# Patient Record
Sex: FEMALE | Race: WHITE | HISPANIC OR LATINO | URBAN - METROPOLITAN AREA
[De-identification: names, ages, dates, MRNs, and addresses within clinical notes are randomized per-mention and may not be internally consistent; named-entity substitution may affect disease eponyms.]

---

## 2018-06-18 ENCOUNTER — HISTORICAL (OUTPATIENT)
Dept: ADMINISTRATIVE | Facility: HOSPITAL | Age: 48
End: 2018-06-18

## 2018-06-25 ENCOUNTER — HISTORICAL (OUTPATIENT)
Dept: ADMINISTRATIVE | Facility: HOSPITAL | Age: 48
End: 2018-06-25

## 2018-07-02 ENCOUNTER — HISTORICAL (OUTPATIENT)
Dept: ADMINISTRATIVE | Facility: HOSPITAL | Age: 48
End: 2018-07-02

## 2018-07-23 ENCOUNTER — HISTORICAL (OUTPATIENT)
Dept: ADMINISTRATIVE | Facility: HOSPITAL | Age: 48
End: 2018-07-23

## 2020-02-27 DIAGNOSIS — R56.9 SEIZURES: Primary | ICD-10-CM

## 2020-03-04 ENCOUNTER — TELEPHONE (OUTPATIENT)
Dept: NEUROLOGY | Facility: CLINIC | Age: 50
End: 2020-03-04

## 2020-03-05 ENCOUNTER — TELEPHONE (OUTPATIENT)
Dept: NEUROLOGY | Facility: CLINIC | Age: 50
End: 2020-03-05

## 2020-03-05 NOTE — TELEPHONE ENCOUNTER
Patient returned my call to schedule emu. She stated that she is not interested in having the emu done. I informed  office.

## 2022-04-09 ENCOUNTER — HISTORICAL (OUTPATIENT)
Dept: ADMINISTRATIVE | Facility: HOSPITAL | Age: 52
End: 2022-04-09
Payer: MEDICAID

## 2022-04-25 VITALS — BODY MASS INDEX: 23.86 KG/M2 | HEIGHT: 64 IN | WEIGHT: 139.75 LBS

## 2022-05-02 NOTE — HISTORICAL [facility] CERNER
This is a historical note converted from Zack. Formatting and pictures may have been removed.  Please reference Zack for original formatting and attached multimedia. Chief Complaint  F/U visit: RW fx  History of Present Illness  The patient is a 48-year-old female approximately 1 month out?from a?right?distal radius fracture being treated nonoperatively. ?The patient reports that she has been compliant with nonweightbearing in her?short arm cast and returns today for follow-up  Review of Systems  Constitutional_ negative for fever or chills  Respiratory_no complaints no SOB  Cardiovascular_no chest pain  Gastrointestinal_ normal bowel history  Immunologic_ no rashes  Musculoskeletal_ see HPI  Integumentary_ normal skin, intact other than stated in hpi  Neurologic_ wnl  All Other ROS_ NEGATIVE  Physical Exam  Vitals & Measurements  HT:?162.5?cm? HT:?162.5?cm? WT:?64.8?kg? WT:?64.8?kg? BMI:?24.54?  Gen: AAOX3 in NAD, normal affect  Pulm: no increasedd WOB, equal chest expansion  CV: WNL by RP  RUE:  skin intact to hand  No gross swelling noted, mild tenderness to palpation?at the distal radius  Sensation intact to light touch radial ulnar median distribution?2+ radial pulse  ?normal cascade to the hand,?strenth not tested  AIN/PIN intact  Assessment/Plan  1.?Radius distal fracture  ?We will continue treating nonoperatively the patient will be placed back in a short arm cast due to tenderness to the fracture site and return to clinic in approximately 3 weeks in which point we will transfer her to [facility]  Ordered:  Clinic Follow up, *Est. 07/23/18 3:00:00 CDT, Order for future visit, Radius distal fracture, [facility]  XR Wrist Right Minimum 3 Views, Routine, 07/02/18 18:38:00 CDT, Trauma, None, Patient Bed, Patient Has IV?, Rad Type, Order for future visit, Radius distal fracture, Not Scheduled, 07/02/18 18:38:00 CDT  ?   Problem List/Past Medical History  Ongoing  No qualifying data  Historical  Anxiety and  depression  Epilepsy  Epileptic seizures  Migraines  Panic attacks  Scoliosis  Procedure/Surgical History  Application of short arm splint (forearm to hand); static (2018), Immobilization of Right Lower Arm using Splint (2018), Removal of Stimulator Generator from Trunk Subcutaneous Tissue and Fascia, Open Approach (2016), Revision or removal of cranial neurostimulator pulse generator or  (2016), Vagus Nerve Stimulator Insertion (.) (2016), Incision for implantation of cranial nerve (eg, vagus nerve) neurostimulator electrode array and pulse generator. (10/06/2014), Insertion or replacement of other neurostimulator pulse generator (10/06/2014), Vagus Nerve Stimulator Insertion (.) (10/06/2014),  x 2 (1994), Appendectomy (1987).  Medications  Citracal + D, BID  citric acid-sodium citrate 334 mg-500 mg/5 mL oral solution, 30 mL, Oral, Once  Depakote 250 mg oral delayed release (EC) tablet, 500 mg= 2 tab(s), Oral, TID,? ?Not Taking per Prescriber  DIAZEPAM GEL 10MG  DIVALPROEX TAB 500MG ER, 1000 mg= 2 tab(s), Oral, BID  Keppra 500 mg oral tablet, 500 mg= 1 tab(s), Oral, TID,? ?Not taking  LACTULOSE SOL 10GM/15, 10 gm= 15 mL, Oral, BID,? ?Not Taking, Completed Rx  LEVETIRACETA TAB 750MG ER  Norco 5 mg-325 mg oral tablet, 1 tab(s), Oral, q6hr, PRN,? ?Not Taking, Completed Rx  Premarin 0.3 mg oral tablet, 0.3 mg= 1 tab(s), Oral, Daily  Ran-Ciproflox 500 mg oral tablet, 1 tab, Oral, BID,? ?Not Taking, Completed Rx  SMZ/TMP DS -160,? ?Not taking  Template Non-Formulary Med, 1 tab(s), Oral, BID,? ?Not taking  Template Non-Formulary Med, 200 mg, Oral, Daily,? ?Not taking  Template Non-Formulary Med  VIMPAT TAB 50MG, 50 mg= 1 tab(s), Oral, BID  Zofran, 4 mg= 2 mL, IV, Once  ZONISAMIDE CAP 100MG  Allergies  No Known Allergies  Social History  Alcohol - Denies Alcohol Use, 10/03/2014  Never, 2016  Substance Abuse - Denies Substance Abuse, 10/03/2014  Never,  06/18/2018  Tobacco - Denies Tobacco Use, 10/03/2014  Never smoker, 06/17/2016  Health Maintenance  Health Maintenance  ???Pending?(in the next year)  ??? ??OverDue  ??? ? ? ?Diabetes Screening due??10/27/17??and every 3??year(s)  ??? ??Due?  ??? ? ? ?Alcohol Misuse Screening due??07/02/18??and every 1??year(s)  ??? ? ? ?Breast Cancer Screening due??07/02/18??Variable frequency  ??? ? ? ?Cervical Cancer Screening due??07/02/18??and every 5??year(s)  ??? ? ? ?Depression Screening due??07/02/18??and every 1??year(s)  ??? ? ? ?Lipid Screening due??07/02/18??Variable frequency  ??? ? ? ?Tetanus Vaccine due??07/02/18??and every 10??year(s)  ??? ??Due In Future?  ??? ? ? ?Influenza Vaccine not due until??10/02/18??and every 1??year(s)  ??? ? ? ?Blood Pressure Screening not due until??06/03/19??and every 1??year(s)  ???Satisfied?(in the past 1 year)  ??? ??Satisfied?  ??? ? ? ?Blood Pressure Screening on??06/03/18.??Satisfied by Dipesh Lee RN  ??? ? ? ?Body Mass Index Check on??07/02/18.??Satisfied by Milligan RN, Desi R  ??? ? ? ?Obesity Screening on??07/02/18.??Satisfied by Milligan RN, Desi R  ??? ? ? ?Tobacco Use Screening on??07/02/18.??Satisfied by Milligan RN, Desi R  ?  ?  Diagnostic Results  3 views of the wrist demonstrates?interval healing noted at the fracture site distal radius with no gross displacement in comparison to prior films      Rhea Santiago?s?medical history, physical exam findings right wrist, diagnosis, and treatment outlined by??Laury has been reviewed.??Treatment plan is determined to be reasonable and appropriate.?I was present during the evaluation. X-rays right wrist?reviewed.  ?

## 2022-05-02 NOTE — HISTORICAL [facility] CERNER
This is a historical note converted from Zack. Formatting and pictures may have been removed.  Please reference Zack for original formatting and attached multimedia. Chief Complaint  F/U Rt wrist Fx  History of Present Illness  patient is a 48-year-old female well-known to our service who returns to clinic?after?of her right wrist. ?Patient does have a distal radius fracture that is being treated nonoperatively approximately 6 weeks out at this point  Review of Systems  Constitutional_ negative for fever or chills  Respiratory_no complaints no SOB  Cardiovascular_no chest pain  Gastrointestinal_ normal bowel history  Immunologic_ no rashes  Musculoskeletal_ see HPI  Integumentary_ normal skin, intact other than stated in hpi  Neurologic_ wnl  All Other ROS_ NEGATIVE  Physical Exam  Vitals & Measurements  HT:?162.5?cm? HT:?162.5?cm? WT:?63.4?kg? WT:?63.4?kg? BMI:?24.01?  Gen: AAOX3 in NAD, normal affect  Pulm: no increasedd WOB, equal chest expansion  CV: WNL by RP  RUE:  skin intact to hand  No gross swelling noted  decreased range of motion noted at the wrist flexion approximately?10? extension to 0 (?post-cast immobilization?)  Sensation intact to light touch radial ulnar median distribution?2+ radial pulse  Patient able to make a full-thickness normal cascade, 4/5 motor tested and hand   AIN/PIN intact  Assessment/Plan  Radius distal fracture  continue nonoperative management.? Well place him Velcro removable splint today.? Patient to wear while out in about in public however when she is home she is to work on range of motion at the wrist flexion and extension.? Patient to remain nonweightbearing to the right upper extremity and return to clinic in 6 weeks.  Ordered:  Clinic Follow up, *Est. 09/03/18 3:00:00 CDT, Order for future visit, Radius distal fracture, [facility]  ?   Problem List/Past Medical History  Ongoing  No qualifying data  Historical  Anxiety and depression  Epilepsy  Epileptic  seizures  Migraines  Panic attacks  Scoliosis  Procedure/Surgical History  Application of short arm splint (forearm to hand); static (2018)  Immobilization of Right Lower Arm using Splint (2018)  Removal of Stimulator Generator from Trunk Subcutaneous Tissue and Fascia, Open Approach (2016)  Revision or removal of cranial neurostimulator pulse generator or  (2016)  Vagus Nerve Stimulator Insertion (.) (2016)  Incision for implantation of cranial nerve (eg, vagus nerve) neurostimulator electrode array and pulse generator. (10/06/2014)  Insertion or replacement of other neurostimulator pulse generator (10/06/2014)  Vagus Nerve Stimulator Insertion (.) (10/06/2014)   x 2 (1994)  Appendectomy (1987)   Medications  Citracal + D, BID  citric acid-sodium citrate 334 mg-500 mg/5 mL oral solution, 30 mL, Oral, Once  Depakote 250 mg oral delayed release (EC) tablet, 500 mg= 2 tab(s), Oral, TID,? ?Not Taking per Prescriber  DIAZEPAM GEL 10MG  DIVALPROEX TAB 500MG ER, 1000 mg= 2 tab(s), Oral, BID,? ?Not taking  Keppra 500 mg oral tablet, 500 mg= 1 tab(s), Oral, TID,? ?Not taking  LACTULOSE SOL 10GM/15, 10 gm= 15 mL, Oral, BID,? ?Not Taking, Completed Rx  LEVETIRACETA TAB 750MG ER  Norco 5 mg-325 mg oral tablet, 1 tab(s), Oral, q6hr, PRN,? ?Not Taking, Completed Rx  Premarin 0.3 mg oral tablet, 0.3 mg= 1 tab(s), Oral, Daily  Ran-Ciproflox 500 mg oral tablet, 1 tab, Oral, BID,? ?Not Taking, Completed Rx  SMZ/TMP DS -160,? ?Not taking  Template Non-Formulary Med, 1 tab(s), Oral, BID,? ?Not taking  Template Non-Formulary Med, 200 mg, Oral, Daily,? ?Not taking  Template Non-Formulary Med  VIMPAT TAB 50MG, 50 mg= 1 tab(s), Oral, BID  Zofran, 4 mg= 2 mL, IV, Once  ZONISAMIDE CAP 100MG  Allergies  No Known Allergies  Social History  Alcohol - Denies Alcohol Use, 10/03/2014  Never, 2016  Substance Abuse - Denies Substance Abuse, 10/03/2014  Never,  06/18/2018  Tobacco - Denies Tobacco Use, 10/03/2014  Never smoker, 06/17/2016  Health Maintenance  Health Maintenance  ???Pending?(in the next year)  ??? ??OverDue  ??? ? ? ?Diabetes Screening due??10/27/17??and every 3??year(s)  ??? ??Due?  ??? ? ? ?Alcohol Misuse Screening due??07/23/18??and every 1??year(s)  ??? ? ? ?Breast Cancer Screening due??07/23/18??Variable frequency  ??? ? ? ?Cervical Cancer Screening due??07/23/18??and every 5??year(s)  ??? ? ? ?Depression Screening due??07/23/18??and every 1??year(s)  ??? ? ? ?Lipid Screening due??07/23/18??Variable frequency  ??? ? ? ?Tetanus Vaccine due??07/23/18??and every 10??year(s)  ??? ??Due In Future?  ??? ? ? ?Influenza Vaccine not due until??10/02/18??and every 1??year(s)  ??? ? ? ?Blood Pressure Screening not due until??06/03/19??and every 1??year(s)  ???Satisfied?(in the past 1 year)  ??? ??Satisfied?  ??? ? ? ?Blood Pressure Screening on??06/03/18.??Satisfied by Dipesh Lee RN  ??? ? ? ?Body Mass Index Check on??07/23/18.??Satisfied by Donny Barnes  ??? ? ? ?Obesity Screening on??07/23/18.??Satisfied by Donny Barnes  ?  ?  Diagnostic Results  imaging demonstrates no gross change?at the fracture site?in comparison to prior imaging, healing fracture of the distal radius      Rhea Santiago?s?medical history, physical exam findings right wrist, diagnosis, and treatment outlined by??Laury has been reviewed.??Treatment plan is determined to be reasonable and appropriate.?I was present during the evaluation. X-rays right wrist?reviewed.  ?

## 2022-05-02 NOTE — HISTORICAL [facility] CERNER
This is a historical note converted from Cermarlene. Formatting and pictures may have been removed.  Please reference Cermarlene for original formatting and attached multimedia. Chief Complaint  Referral for Rt wrist Fx.  History of Present Illness  38-year-old female?epileptic here new patient referred to us for right distal radius fracture  Date of injury Francoise 3, 2018  Has been immobilized  Denies any new seizures or any new trauma since the seizure that resulted in a wrist fracture  Reports wrist pain  Denies pain elsewhere  Review of Systems  Negative  Physical Exam  Vitals & Measurements  WT:?64?kg? WT:?64?kg?  Right upper extremity  Mild?swelling about right distal radius  Tender palpation over fracture  Neurovascularly intact  Brisk cap refill to fingers  Assessment/Plan  Right wrist pain  ?Right distal radius fracture,?minimally displaced  Meets?nonoperative criteria  We will place in?a short arm cast  Return to clinic in 1 week for repeat x-rays out of cast  We will follow the patient serially for the next 3 weeks  Ordered:  XR Wrist Right Minimum 3 Views, Routine, 06/18/18 9:36:00 CDT, Fall, None, Patient Bed, Patient Has IV?, Rad Type, Right wrist pain, 06/18/18 9:36:00 CDT  ?   Problem List/Past Medical History  Ongoing  No qualifying data  Historical  Anxiety and depression  Epilepsy  Epileptic seizures  Migraines  Panic attacks  Scoliosis  Procedure/Surgical History  Application of short arm splint (forearm to hand); static (06/03/2018), Immobilization of Right Lower Arm using Splint (06/03/2018), Removal of Stimulator Generator from Trunk Subcutaneous Tissue and Fascia, Open Approach (06/20/2016), Revision or removal of cranial neurostimulator pulse generator or  (06/20/2016), Vagus Nerve Stimulator Insertion (.) (06/20/2016), Incision for implantation of cranial nerve (eg, vagus nerve) neurostimulator electrode array and pulse generator. (10/06/2014), Insertion or replacement of other neurostimulator  pulse generator (10/06/2014), Vagus Nerve Stimulator Insertion (.) (10/06/2014),  x 2 (1994), Appendectomy (1987).  Medications  Inpatient  citric acid-sodium citrate 334 mg-500 mg/5 mL oral solution, 30 mL, Oral, Once  Zofran, 4 mg= 2 mL, IV, Once  Home  Citracal + D, BID  Depakote 250 mg oral delayed release (EC) tablet, 500 mg= 2 tab(s), Oral, TID,? ?Not Taking per Prescriber  DIAZEPAM GEL 10MG  DIVALPROEX TAB 500MG ER, 1000 mg= 2 tab(s), Oral, BID  Keppra 500 mg oral tablet, 500 mg= 1 tab(s), Oral, TID  LACTULOSE SOL 10GM/15, 10 gm= 15 mL, Oral, BID,? ?Not Taking, Completed Rx  LEVETIRACETA TAB 750MG ER,? ?Not taking  Norco 5 mg-325 mg oral tablet, 1 tab(s), Oral, q6hr, PRN,? ?Not Taking, Completed Rx  Premarin 0.3 mg oral tablet, 0.3 mg= 1 tab(s), Oral, Daily  Ran-Ciproflox 500 mg oral tablet, 1 tab, Oral, BID,? ?Not Taking, Completed Rx  SMZ/TMP DS -160,? ?Not taking  Template Non-Formulary Med, 1 tab(s), Oral, BID,? ?Not taking  Template Non-Formulary Med, 200 mg, Oral, Daily,? ?Not taking  Template Non-Formulary Med  VIMPAT TAB 50MG, 50 mg= 1 tab(s), Oral, BID  ZONISAMIDE CAP 100MG  Allergies  No Known Allergies  Social History  Alcohol - Denies Alcohol Use, 10/03/2014  Never, 2016  Substance Abuse - Denies Substance Abuse, 10/03/2014  Never, 2018  Tobacco - Denies Tobacco Use, 10/03/2014  Never smoker, 2016  Diagnostic Results  X-ray right wrist: Right minimally displaced distal radius fracture      Rhea Jacobsens?s?medical history, physical exam findings right wrist, diagnosis, and treatment outlined by??Lobo?has been reviewed.??Treatment plan using a short arm cast ?is determined to be reasonable and appropriate.?I was present during the evaluation. ?X-rays right wrist reviewed.

## 2022-05-02 NOTE — HISTORICAL [facility] CERNER
This is a historical note converted from Zack. Formatting and pictures may have been removed.  Please reference Cermarlene for original formatting and attached multimedia. Chief Complaint  F/U Rt wrist Fx.  History of Present Illness  48-year-old female epileptic here following up her nonoperative?right distal radius fracture  Date of injury?Francoise 3, 2018  Has been in cast  Denies any issue  Review of Systems  Negative  Physical Exam  Vitals & Measurements  WT:?64?kg? WT:?64?kg?  Right upper extremity  Mild swelling about right distal radius  Ecchymosis about right distal radius  Minimally tender palpation  Neurovascularly intact, unchanged  Brisk cap refill of fingertips  Able to extend thumb and fingers  Assessment/Plan  Colles fracture of right radius, initial encounter for closed fracture  Right distal radius fracture, minimally displaced,?continues to meet nonoperative criteria.  We will place an short arm cast.  Return to clinic in 1 week time for repeat x-ray out of cast.  We will follow the patient serially for the next 2 weeks   Problem List/Past Medical History  Ongoing  No qualifying data  Historical  Anxiety and depression  Epilepsy  Epileptic seizures  Migraines  Panic attacks  Scoliosis  Procedure/Surgical History  Application of short arm splint (forearm to hand); static (2018), Immobilization of Right Lower Arm using Splint (2018), Removal of Stimulator Generator from Trunk Subcutaneous Tissue and Fascia, Open Approach (2016), Revision or removal of cranial neurostimulator pulse generator or  (2016), Vagus Nerve Stimulator Insertion (.) (2016), Incision for implantation of cranial nerve (eg, vagus nerve) neurostimulator electrode array and pulse generator. (10/06/2014), Insertion or replacement of other neurostimulator pulse generator (10/06/2014), Vagus Nerve Stimulator Insertion (.) (10/06/2014),  x 2 (1994), Appendectomy  (11/05/1987).  Medications  Inpatient  citric acid-sodium citrate 334 mg-500 mg/5 mL oral solution, 30 mL, Oral, Once  Zofran, 4 mg= 2 mL, IV, Once  Home  Citracal + D, BID  Depakote 250 mg oral delayed release (EC) tablet, 500 mg= 2 tab(s), Oral, TID,? ?Not Taking per Prescriber  DIAZEPAM GEL 10MG  DIVALPROEX TAB 500MG ER, 1000 mg= 2 tab(s), Oral, BID  Keppra 500 mg oral tablet, 500 mg= 1 tab(s), Oral, TID  LACTULOSE SOL 10GM/15, 10 gm= 15 mL, Oral, BID,? ?Not Taking, Completed Rx  LEVETIRACETA TAB 750MG ER,? ?Not taking  Norco 5 mg-325 mg oral tablet, 1 tab(s), Oral, q6hr, PRN,? ?Not Taking, Completed Rx  Premarin 0.3 mg oral tablet, 0.3 mg= 1 tab(s), Oral, Daily  Ran-Ciproflox 500 mg oral tablet, 1 tab, Oral, BID,? ?Not Taking, Completed Rx  SMZ/TMP DS -160,? ?Not taking  Template Non-Formulary Med, 1 tab(s), Oral, BID,? ?Not taking  Template Non-Formulary Med, 200 mg, Oral, Daily,? ?Not taking  Template Non-Formulary Med  VIMPAT TAB 50MG, 50 mg= 1 tab(s), Oral, BID  ZONISAMIDE CAP 100MG  Allergies  No Known Allergies  Social History  Alcohol - Denies Alcohol Use, 10/03/2014  Never, 06/17/2016  Substance Abuse - Denies Substance Abuse, 10/03/2014  Never, 06/18/2018  Tobacco - Denies Tobacco Use, 10/03/2014  Never smoker, 06/17/2016  Diagnostic Results  Right wrist?x-ray:?Healing minimally displaced right distal radius fracture      Rhea Santiago?s?medical history, physical exam findings right wrist, diagnosis, and treatment outlined by??has been reviewed.??Treatment plan with a new cast?is determined to be reasonable and appropriate.?I was present during the evaluation. ?X-rays right?distal radius?reviewed.